# Patient Record
Sex: FEMALE | Race: WHITE | Employment: FULL TIME | URBAN - METROPOLITAN AREA
[De-identification: names, ages, dates, MRNs, and addresses within clinical notes are randomized per-mention and may not be internally consistent; named-entity substitution may affect disease eponyms.]

---

## 2017-09-13 ENCOUNTER — HOSPITAL ENCOUNTER (EMERGENCY)
Age: 47
Discharge: HOME | End: 2017-09-13
Attending: EMERGENCY MEDICINE
Payer: MEDICAID

## 2017-09-13 ENCOUNTER — APPOINTMENT (OUTPATIENT)
Dept: GENERAL RADIOLOGY | Age: 47
End: 2017-09-13
Payer: MEDICAID

## 2017-09-13 VITALS
RESPIRATION RATE: 18 BRPM | HEART RATE: 88 BPM | TEMPERATURE: 97 F | OXYGEN SATURATION: 99 % | SYSTOLIC BLOOD PRESSURE: 126 MMHG | DIASTOLIC BLOOD PRESSURE: 88 MMHG

## 2017-09-13 DIAGNOSIS — T14.8XXA OPEN WOUND: Primary | ICD-10-CM

## 2017-09-13 PROCEDURE — 73610 X-RAY EXAM OF ANKLE: CPT

## 2017-09-13 PROCEDURE — 99283 EMERGENCY DEPT VISIT LOW MDM: CPT

## 2017-09-13 RX ORDER — MUPIROCIN CALCIUM 20 MG/G
CREAM TOPICAL
Qty: 1 TUBE | Refills: 0 | Status: SHIPPED | OUTPATIENT
Start: 2017-09-13 | End: 2017-10-13

## 2017-09-13 ASSESSMENT — ENCOUNTER SYMPTOMS
WHEEZING: 0
COUGH: 0
VOMITING: 0
NAUSEA: 0
COLOR CHANGE: 1
ABDOMINAL PAIN: 0

## 2017-09-13 ASSESSMENT — PAIN SCALES - GENERAL: PAINLEVEL_OUTOF10: 6

## 2017-09-13 ASSESSMENT — PAIN DESCRIPTION - DESCRIPTORS: DESCRIPTORS: DISCOMFORT

## 2017-09-13 ASSESSMENT — PAIN DESCRIPTION - LOCATION: LOCATION: ANKLE

## 2017-09-13 ASSESSMENT — PAIN DESCRIPTION - ORIENTATION: ORIENTATION: RIGHT

## 2017-10-27 ENCOUNTER — HOSPITAL ENCOUNTER (EMERGENCY)
Age: 47
Discharge: HOME | End: 2017-10-27
Attending: EMERGENCY MEDICINE
Payer: MEDICAID

## 2017-10-27 VITALS
SYSTOLIC BLOOD PRESSURE: 124 MMHG | RESPIRATION RATE: 16 BRPM | DIASTOLIC BLOOD PRESSURE: 60 MMHG | OXYGEN SATURATION: 100 % | TEMPERATURE: 98 F | BODY MASS INDEX: 35.88 KG/M2 | HEIGHT: 62 IN | WEIGHT: 195 LBS | HEART RATE: 72 BPM

## 2017-10-27 DIAGNOSIS — L02.91 ABSCESS: Primary | ICD-10-CM

## 2017-10-27 PROCEDURE — 99282 EMERGENCY DEPT VISIT SF MDM: CPT

## 2017-10-27 RX ORDER — CEPHALEXIN 500 MG/1
500 CAPSULE ORAL 4 TIMES DAILY
Qty: 40 CAPSULE | Refills: 0 | Status: SHIPPED | OUTPATIENT
Start: 2017-10-27 | End: 2017-11-06

## 2017-10-27 RX ORDER — SULFAMETHOXAZOLE AND TRIMETHOPRIM 800; 160 MG/1; MG/1
1 TABLET ORAL 2 TIMES DAILY
Qty: 20 TABLET | Refills: 0 | Status: SHIPPED | OUTPATIENT
Start: 2017-10-27 | End: 2017-11-06

## 2017-10-27 ASSESSMENT — PAIN DESCRIPTION - LOCATION: LOCATION: BACK

## 2017-10-27 ASSESSMENT — PAIN DESCRIPTION - ORIENTATION: ORIENTATION: LEFT

## 2017-10-27 ASSESSMENT — PAIN DESCRIPTION - PAIN TYPE: TYPE: ACUTE PAIN

## 2017-10-27 ASSESSMENT — ENCOUNTER SYMPTOMS
TROUBLE SWALLOWING: 0
COUGH: 0
SHORTNESS OF BREATH: 0
NAUSEA: 0
COLOR CHANGE: 1
VOMITING: 0

## 2017-10-27 ASSESSMENT — PAIN SCALES - GENERAL: PAINLEVEL_OUTOF10: 8

## 2017-10-27 ASSESSMENT — PAIN DESCRIPTION - DESCRIPTORS: DESCRIPTORS: ACHING

## 2017-10-27 NOTE — ED PROVIDER NOTES
[address]  eMERGENCY dEPARTMENT eNCOUnter      Pt Name: Vi Cristina  MRN: [id number]  [place] 1970  Date of evaluation: 10/27/2017  Provider: Ute Chanel [id number]       Chief Complaint   Patient presents with    Insect Bite         HISTORY OF PRESENT ILLNESS  (Location/Symptom, Timing/Onset, Context/Setting, Quality, Duration, Modifying Factors, Severity.)   Vi Cristina is a 49 y.o. female who presents to the emergency department With complaints of insect bite to right sided middle back. States she noticed the area painful, red and tender few days ago. Patient was concerned she had a spider bite. States that there is purulent drainage coming from the insect bite. Patient reports that she felt warm yesterday and took Tylenol. Reports chills. Denies nausea or vomiting. She is not diabetic. Denies history of MRSA in the past.  States she is here visiting from [place] and was going back home tomorrow. Nursing Notes were reviewed and I agree. REVIEW OF SYSTEMS    (2-9 systems for level 4, 10 or more for level 5)     Review of Systems   Constitutional: Negative for chills and fever. HENT: Negative for trouble swallowing. Respiratory: Negative for cough and shortness of breath. Cardiovascular: Negative for chest pain and palpitations. Gastrointestinal: Negative for nausea and vomiting. Skin: Positive for color change and wound. Except as noted above the remainder of the review of systems was reviewed and negative. PAST MEDICAL HISTORY   History reviewed. No pertinent past medical history. Reviewed. SURGICAL HISTORY           Procedure Laterality Date    CHOLECYSTECTOMY      KNEE ARTHROPLASTY Right      Reviewed. CURRENT MEDICATIONS       Discharge Medication List as of 10/27/2017  3:16 PM          ALLERGIES     Review of patient's allergies indicates no known allergies. FAMILY HISTORY     History reviewed.  No pertinent family 1 or 2 days for a recheck. Return to ED if any worsening or new symptoms. Vitals:    Vitals:    10/27/17 1434   BP: 124/60   Pulse: 72   Resp: 16   Temp: 98 °F (36.7 °C)   TempSrc: Oral   SpO2: 100%   Weight: 195 lb (88.5 kg)   Height: 5' 1.5\" (1.562 m)       Vitals reviewed. PROCEDURES:  None    FINAL IMPRESSION      1.  Abscess          DISPOSITION/PLAN   DISPOSITION Decision to Discharge    PATIENT REFERRED TO:  [facility] ED  [facility]  [address] [address]  [phone]    If symptoms worsen      DISCHARGE MEDICATIONS:  Discharge Medication List as of 10/27/2017  3:16 PM      START taking these medications    Details   sulfamethoxazole-trimethoprim (BACTRIM DS) 800-160 MG per tablet Take 1 tablet by mouth 2 times daily for 10 days, Disp-20 tablet, R-0Print      cephALEXin (KEFLEX) 500 MG capsule Take 1 capsule by mouth 4 times daily for 10 days, Disp-40 capsule, R-0Print             (Please note that portions of this note were completed with a voice recognition program.  Efforts were made to edit the dictations but occasionally words are mis-transcribed.)    Gaston Huerta, CNP  10/27/17 [address] Jovany, CNP  10/27/17 [id number]